# Patient Record
Sex: MALE | Race: WHITE | NOT HISPANIC OR LATINO | URBAN - METROPOLITAN AREA
[De-identification: names, ages, dates, MRNs, and addresses within clinical notes are randomized per-mention and may not be internally consistent; named-entity substitution may affect disease eponyms.]

---

## 2018-03-20 NOTE — PATIENT DISCUSSION
CATARACTS, OU - VISUALLY SIGNIFICANT. SCHEDULE SX OD THEN LATER IN OS IF VISUAL SYMPTOMS PERSIST.  GLS RX GIVEN TO FILL IF DESIRES IN THE EVENT PT DOES NOT PROCEED W/ SX.

## 2018-03-20 NOTE — PATIENT DISCUSSION
REFRACTIVE ERROR, OU - DISCUSSED OPTION OF CORRECTING AT THE TIME OF CATARACT SURGERY. DISCUSSED EDOF/MF COMBO AND TO WEAR READERS FOR FINE PRINT ALONG WITH RBAS OF EACH LENS.

## 2018-03-20 NOTE — PATIENT DISCUSSION
Glasses Option Counseling: I have discussed the option of glasses versus cataract surgery versus following. It was explained that when vision no longer meets the patient?s visual needs and a new prescription for glasses is not likely to satisfy the patient, the option of cataract surgery is a reasonable next step. It has been determined from manifest refraction and vision testing that a new prescription for glasses may help improve the visual symptoms somewhat but is not likely to improve all the patient?s visual symptoms. The patient was offered a new prescription for glasses. The patient has declined the option of glasses. It was explained that there is no guarantee that removing the cataract will improve their visual symptoms, however; it is believed that the cataract is contributing to the patient's visual impairment and surgery may significantly improve both the visual and functional status of the patient. The risks, benefits and alternatives of surgery were discussed with the patient. After this discussion, the patient desires to proceed with cataract surgery with implantation of an intraocular lens to improve vision for reading street signs and to reduce glare.

## 2018-03-20 NOTE — PATIENT DISCUSSION
ADV PT HIS BEST QUALITY OF VA WILL BE DVA AND TO WEAR READERS. ALSO ADV PT THAT HE WILL NOTICE HALOS FROM THE EDOF/MF AT NIGHT.

## 2018-03-29 NOTE — PATIENT DISCUSSION
***This patient had refractive cataract surgery performed. A EDOF / toric IOL was placed to achieve a target refraction of plano (which should provide them with  distance and near / intermediate vision). ***

## 2018-03-29 NOTE — PATIENT DISCUSSION
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 OK TO PROCEED WITH FELLOW EYE SURGERY. PT REPORTS THAT THEY ARE HAPPY WITH THE OUTCOME OF THE OPERATIVE EYE AND READY TO PURSUE SX IN THE FELLOW EYE.

## 2018-04-05 NOTE — PATIENT DISCUSSION
***This patient had refractive cataract surgery performed. A  multifocal IOL was placed to achieve a target refraction of plano which should provide them with satisfactory distance and near vision. ***

## 2022-08-03 ENCOUNTER — TECH ONLY (OUTPATIENT)
Facility: LOCATION | Age: 82
End: 2022-08-03

## 2022-08-03 DIAGNOSIS — H52.223: ICD-10-CM

## 2022-08-03 DIAGNOSIS — H25.813: ICD-10-CM

## 2022-08-03 PROCEDURE — 92025NC CORNEAL TOPOGRAPHY NO CHARGE

## 2022-08-03 PROCEDURE — 92136TCNC INTERFEROMETRY -TECHNICAL COMPONENT NO CHARGE

## 2022-08-24 ENCOUNTER — SURGERY/PROCEDURE (OUTPATIENT)
Facility: LOCATION | Age: 82
End: 2022-08-24

## 2022-08-24 DIAGNOSIS — H25.811: ICD-10-CM

## 2022-08-24 PROCEDURE — 66984AV REMOVE CATARACT, INSERT ADVANCED LENS

## 2022-08-25 ENCOUNTER — POST-OP (OUTPATIENT)
Facility: LOCATION | Age: 82
End: 2022-08-25

## 2022-08-25 DIAGNOSIS — Z96.1: ICD-10-CM

## 2022-08-25 PROCEDURE — 99024 POSTOP FOLLOW-UP VISIT: CPT

## 2022-08-25 ASSESSMENT — VISUAL ACUITY
OD_SC: J16
OS_SC: J2
OU_SC: J2
OU_SC: 20/30
OD_PH: 20/50-1
OD_SC: 20/80-2
OS_SC: 20/30

## 2022-08-25 ASSESSMENT — TONOMETRY
OD_IOP_MMHG: 54
OD_IOP_MMHG: 56
OS_IOP_MMHG: 21
OD_IOP_MMHG: 25

## 2022-08-29 ENCOUNTER — POST-OP (OUTPATIENT)
Facility: LOCATION | Age: 82
End: 2022-08-29

## 2022-08-29 DIAGNOSIS — Z96.1: ICD-10-CM

## 2022-08-29 PROCEDURE — 99024 POSTOP FOLLOW-UP VISIT: CPT

## 2022-08-29 ASSESSMENT — VISUAL ACUITY
OU_SC: 20/25
OS_SC: 20/30
OD_SC: J7
OD_SC: 20/30-1
OS_SC: J5
OU_SC: J5

## 2022-08-29 ASSESSMENT — TONOMETRY
OD_IOP_MMHG: 16
OS_IOP_MMHG: 21

## 2022-09-07 ENCOUNTER — SURGERY/PROCEDURE (OUTPATIENT)
Facility: LOCATION | Age: 82
End: 2022-09-07

## 2022-09-07 DIAGNOSIS — H25.812: ICD-10-CM

## 2022-09-07 PROCEDURE — 66984AV REMOVE CATARACT, INSERT ADVANCED LENS

## 2022-09-08 ENCOUNTER — POST-OP (OUTPATIENT)
Facility: LOCATION | Age: 82
End: 2022-09-08

## 2022-09-08 DIAGNOSIS — Z96.1: ICD-10-CM

## 2022-09-08 PROCEDURE — 99024 POSTOP FOLLOW-UP VISIT: CPT

## 2022-09-08 RX ORDER — LOTEPREDNOL ETABONATE 3.8 MG/G: 1 GEL OPHTHALMIC ONCE A DAY

## 2022-09-08 ASSESSMENT — VISUAL ACUITY
OU_SC: 20/25-1
OS_SC: J2
OS_SC: 20/50+2
OD_SC: J3
OU_SC: J2
OD_SC: 20/25-2

## 2022-09-08 ASSESSMENT — TONOMETRY
OD_IOP_MMHG: 21
OS_IOP_MMHG: 24
OS_IOP_MMHG: 25
OD_IOP_MMHG: 21

## 2022-09-21 ENCOUNTER — POST-OP (OUTPATIENT)
Facility: LOCATION | Age: 82
End: 2022-09-21

## 2022-09-21 DIAGNOSIS — Z96.1: ICD-10-CM

## 2022-09-21 PROCEDURE — 99024 POSTOP FOLLOW-UP VISIT: CPT

## 2022-09-21 ASSESSMENT — VISUAL ACUITY
OS_SC: J3
OU_SC: 20/20
OD_SC: J5
OD_SC: 20/20-1
OS_SC: 20/30+2

## 2022-09-21 ASSESSMENT — TONOMETRY
OS_IOP_MMHG: 20
OD_IOP_MMHG: 21

## 2022-10-05 ENCOUNTER — POST-OP (OUTPATIENT)
Facility: LOCATION | Age: 82
End: 2022-10-05

## 2022-10-05 DIAGNOSIS — Z96.1: ICD-10-CM

## 2022-10-05 PROCEDURE — 92134 CPTRZ OPH DX IMG PST SGM RTA: CPT

## 2022-10-05 PROCEDURE — 99024 POSTOP FOLLOW-UP VISIT: CPT

## 2022-10-05 ASSESSMENT — VISUAL ACUITY
OD_SC: 20/20-2
OS_SC: 20/20-2

## 2022-10-05 ASSESSMENT — TONOMETRY
OS_IOP_MMHG: 15
OD_IOP_MMHG: 15

## 2022-10-19 ENCOUNTER — POST-OP (OUTPATIENT)
Facility: LOCATION | Age: 82
End: 2022-10-19

## 2022-10-19 DIAGNOSIS — H10.13: ICD-10-CM

## 2022-10-19 DIAGNOSIS — Z96.1: ICD-10-CM

## 2022-10-19 PROCEDURE — 99024 POSTOP FOLLOW-UP VISIT: CPT

## 2022-10-19 ASSESSMENT — TONOMETRY
OS_IOP_MMHG: 14
OD_IOP_MMHG: 14

## 2022-10-19 ASSESSMENT — VISUAL ACUITY
OU_SC: J2
OD_SC: J5
OD_SC: 20/20-2
OS_SC: 20/20
OS_SC: J3
OU_SC: 20/20

## 2023-04-18 ENCOUNTER — ESTABLISHED PATIENT (OUTPATIENT)
Facility: LOCATION | Age: 83
End: 2023-04-18

## 2023-04-18 DIAGNOSIS — H10.13: ICD-10-CM

## 2023-04-18 DIAGNOSIS — H04.123: ICD-10-CM

## 2023-04-18 DIAGNOSIS — E11.9: ICD-10-CM

## 2023-04-18 DIAGNOSIS — Z96.1: ICD-10-CM

## 2023-04-18 PROCEDURE — 92014 COMPRE OPH EXAM EST PT 1/>: CPT

## 2023-04-18 ASSESSMENT — TONOMETRY
OS_IOP_MMHG: 13
OD_IOP_MMHG: 9

## 2023-04-18 ASSESSMENT — VISUAL ACUITY
OS_SC: J3-1
OD_SC: 20/25-1
OS_SC: 20/25-2
OD_SC: J2-1

## 2024-04-30 ENCOUNTER — ESTABLISHED PATIENT (OUTPATIENT)
Facility: LOCATION | Age: 84
End: 2024-04-30

## 2024-04-30 DIAGNOSIS — H40.013: ICD-10-CM

## 2024-04-30 DIAGNOSIS — H04.123: ICD-10-CM

## 2024-04-30 DIAGNOSIS — H10.13: ICD-10-CM

## 2024-04-30 DIAGNOSIS — Z96.1: ICD-10-CM

## 2024-04-30 DIAGNOSIS — E11.9: ICD-10-CM

## 2024-04-30 PROCEDURE — 92133 CPTRZD OPH DX IMG PST SGM ON: CPT

## 2024-04-30 PROCEDURE — 92014 COMPRE OPH EXAM EST PT 1/>: CPT

## 2024-04-30 ASSESSMENT — VISUAL ACUITY
OD_SC: 20/25-2
OS_SC: 20/25-2
OD_SC: J5-2
OS_SC: J3+2

## 2024-04-30 ASSESSMENT — TONOMETRY
OS_IOP_MMHG: 14
OD_IOP_MMHG: 14

## 2024-07-31 ENCOUNTER — COMPREHENSIVE EXAM (OUTPATIENT)
Facility: LOCATION | Age: 84
End: 2024-07-31